# Patient Record
Sex: FEMALE | Race: BLACK OR AFRICAN AMERICAN | Employment: FULL TIME | URBAN - METROPOLITAN AREA
[De-identification: names, ages, dates, MRNs, and addresses within clinical notes are randomized per-mention and may not be internally consistent; named-entity substitution may affect disease eponyms.]

---

## 2017-04-24 ENCOUNTER — HOSPITAL ENCOUNTER (OUTPATIENT)
Dept: MAMMOGRAPHY | Age: 42
Discharge: HOME | End: 2017-04-24
Attending: FAMILY MEDICINE
Payer: COMMERCIAL

## 2017-04-24 ENCOUNTER — HOSPITAL ENCOUNTER (OUTPATIENT)
Dept: ULTRASOUND IMAGING | Age: 42
Discharge: HOME | End: 2017-04-24
Attending: FAMILY MEDICINE
Payer: COMMERCIAL

## 2017-04-24 DIAGNOSIS — E01.0 THYROMEGALY: ICD-10-CM

## 2017-04-24 DIAGNOSIS — Z12.31 VISIT FOR SCREENING MAMMOGRAM: ICD-10-CM

## 2017-04-24 PROCEDURE — 77067 SCR MAMMO BI INCL CAD: CPT

## 2017-04-24 PROCEDURE — 76536 US EXAM OF HEAD AND NECK: CPT

## 2017-05-30 ENCOUNTER — HOSPITAL ENCOUNTER (OUTPATIENT)
Dept: MAMMOGRAPHY | Age: 42
Discharge: HOME | End: 2017-05-30
Attending: FAMILY MEDICINE
Payer: COMMERCIAL

## 2017-05-30 ENCOUNTER — HOSPITAL ENCOUNTER (OUTPATIENT)
Dept: ULTRASOUND IMAGING | Age: 42
Discharge: HOME | End: 2017-05-30
Attending: FAMILY MEDICINE
Payer: COMMERCIAL

## 2017-05-30 DIAGNOSIS — N64.89 BREAST ASYMMETRY: ICD-10-CM

## 2017-05-30 PROCEDURE — 77065 DX MAMMO INCL CAD UNI: CPT

## 2017-05-30 PROCEDURE — 76642 ULTRASOUND BREAST LIMITED: CPT

## 2017-11-24 ENCOUNTER — HOSPITAL ENCOUNTER (OUTPATIENT)
Dept: ULTRASOUND IMAGING | Age: 42
Discharge: HOME | End: 2017-11-24
Attending: FAMILY MEDICINE
Payer: COMMERCIAL

## 2017-11-24 ENCOUNTER — HOSPITAL ENCOUNTER (OUTPATIENT)
Dept: MAMMOGRAPHY | Age: 42
Discharge: HOME | End: 2017-11-24
Attending: FAMILY MEDICINE
Payer: COMMERCIAL

## 2017-11-24 DIAGNOSIS — N64.89 BREAST ASYMMETRY: ICD-10-CM

## 2017-11-24 PROCEDURE — 77061 BREAST TOMOSYNTHESIS UNI: CPT

## 2017-11-24 PROCEDURE — 76642 ULTRASOUND BREAST LIMITED: CPT

## 2018-05-31 ENCOUNTER — HOSPITAL ENCOUNTER (OUTPATIENT)
Dept: ULTRASOUND IMAGING | Age: 43
Discharge: HOME | End: 2018-05-31
Attending: FAMILY MEDICINE
Payer: COMMERCIAL

## 2018-05-31 ENCOUNTER — HOSPITAL ENCOUNTER (OUTPATIENT)
Dept: MAMMOGRAPHY | Age: 43
Discharge: HOME | End: 2018-05-31
Attending: FAMILY MEDICINE
Payer: COMMERCIAL

## 2018-05-31 DIAGNOSIS — N64.89 BREAST ASYMMETRY IN FEMALE: ICD-10-CM

## 2018-05-31 PROCEDURE — 76642 ULTRASOUND BREAST LIMITED: CPT

## 2018-05-31 PROCEDURE — 77062 BREAST TOMOSYNTHESIS BI: CPT

## 2019-05-24 ENCOUNTER — HOSPITAL ENCOUNTER (OUTPATIENT)
Dept: MAMMOGRAPHY | Age: 44
Discharge: HOME | End: 2019-05-24
Attending: FAMILY MEDICINE
Payer: COMMERCIAL

## 2019-05-24 DIAGNOSIS — Z12.31 VISIT FOR SCREENING MAMMOGRAM: ICD-10-CM

## 2019-05-24 PROCEDURE — 77067 SCR MAMMO BI INCL CAD: CPT

## 2020-11-02 ENCOUNTER — TRANSCRIBE ORDER (OUTPATIENT)
Dept: SCHEDULING | Age: 45
End: 2020-11-02

## 2020-11-02 DIAGNOSIS — Z12.31 VISIT FOR SCREENING MAMMOGRAM: Primary | ICD-10-CM

## 2021-08-17 ENCOUNTER — TRANSCRIBE ORDER (OUTPATIENT)
Dept: SCHEDULING | Age: 46
End: 2021-08-17

## 2021-08-17 DIAGNOSIS — Z12.31 VISIT FOR SCREENING MAMMOGRAM: Primary | ICD-10-CM

## 2022-08-15 ENCOUNTER — TRANSCRIBE ORDER (OUTPATIENT)
Dept: SCHEDULING | Age: 47
End: 2022-08-15

## 2022-08-15 DIAGNOSIS — Z12.31 VISIT FOR SCREENING MAMMOGRAM: Primary | ICD-10-CM

## 2022-10-19 ENCOUNTER — HOSPITAL ENCOUNTER (OUTPATIENT)
Dept: MAMMOGRAPHY | Age: 47
Discharge: HOME | End: 2022-10-19
Attending: FAMILY MEDICINE
Payer: COMMERCIAL

## 2022-10-19 DIAGNOSIS — Z12.31 VISIT FOR SCREENING MAMMOGRAM: ICD-10-CM

## 2022-10-19 PROCEDURE — 77063 BREAST TOMOSYNTHESIS BI: CPT

## 2023-10-27 ENCOUNTER — HOSPITAL ENCOUNTER (OUTPATIENT)
Facility: HOSPITAL | Age: 48
Discharge: HOME | End: 2023-10-27
Attending: FAMILY MEDICINE
Payer: COMMERCIAL

## 2023-10-27 DIAGNOSIS — Z12.31 VISIT FOR SCREENING MAMMOGRAM: ICD-10-CM

## 2023-10-27 PROCEDURE — 77067 SCR MAMMO BI INCL CAD: CPT

## 2024-01-26 ENCOUNTER — OFFICE VISIT (OUTPATIENT)
Age: 49
End: 2024-01-26

## 2024-01-26 VITALS — BODY MASS INDEX: 41.59 KG/M2 | WEIGHT: 265 LBS | HEIGHT: 67 IN

## 2024-01-26 DIAGNOSIS — G89.29 CHRONIC PAIN OF RIGHT HIP: ICD-10-CM

## 2024-01-26 DIAGNOSIS — M25.551 CHRONIC PAIN OF RIGHT HIP: ICD-10-CM

## 2024-01-26 DIAGNOSIS — M16.12 PRIMARY OSTEOARTHRITIS OF LEFT HIP: ICD-10-CM

## 2024-01-26 DIAGNOSIS — M47.816 PRIMARY OSTEOARTHRITIS OF LUMBAR SPINE: ICD-10-CM

## 2024-01-26 DIAGNOSIS — G89.29 CHRONIC LEFT HIP PAIN: Primary | ICD-10-CM

## 2024-01-26 DIAGNOSIS — M54.50 LUMBAR PAIN: ICD-10-CM

## 2024-01-26 DIAGNOSIS — M25.552 CHRONIC LEFT HIP PAIN: Primary | ICD-10-CM

## 2024-01-26 DIAGNOSIS — M16.11 PRIMARY OSTEOARTHRITIS OF RIGHT HIP: ICD-10-CM

## 2024-01-26 DIAGNOSIS — M70.62 TROCHANTERIC BURSITIS, LEFT HIP: ICD-10-CM

## 2024-01-26 RX ORDER — LIDOCAINE HYDROCHLORIDE 10 MG/ML
3 INJECTION, SOLUTION INFILTRATION; PERINEURAL ONCE
Status: COMPLETED | OUTPATIENT
Start: 2024-01-26 | End: 2024-01-26

## 2024-01-26 RX ORDER — DICLOFENAC EPOLAMINE 0.01 G/1
1 SYSTEM TOPICAL 2 TIMES DAILY PRN
Qty: 60 PATCH | Refills: 1 | Status: SHIPPED | OUTPATIENT
Start: 2024-01-26

## 2024-01-26 RX ORDER — BETAMETHASONE SODIUM PHOSPHATE AND BETAMETHASONE ACETATE 3; 3 MG/ML; MG/ML
6 INJECTION, SUSPENSION INTRA-ARTICULAR; INTRALESIONAL; INTRAMUSCULAR; SOFT TISSUE ONCE
Status: COMPLETED | OUTPATIENT
Start: 2024-01-26 | End: 2024-01-26

## 2024-01-26 RX ORDER — MELOXICAM 15 MG/1
15 TABLET ORAL DAILY PRN
Qty: 30 TABLET | Refills: 3 | Status: SHIPPED | OUTPATIENT
Start: 2024-01-26

## 2024-01-26 RX ADMIN — LIDOCAINE HYDROCHLORIDE 3 ML: 10 INJECTION, SOLUTION INFILTRATION; PERINEURAL at 08:55

## 2024-01-26 RX ADMIN — BETAMETHASONE SODIUM PHOSPHATE AND BETAMETHASONE ACETATE 6 MG: 3; 3 INJECTION, SUSPENSION INTRA-ARTICULAR; INTRALESIONAL; INTRAMUSCULAR; SOFT TISSUE at 08:54

## 2024-01-26 NOTE — PROGRESS NOTES
were reviewed and are negative           Hernandez JEFFREY M.D., have reviewed the history, physical, and have updated the allergic reactions for Margy Tapia         TIME OUT performed immediately prior to the start of procedure:     Hernandez JEFFREY M.D., have performed the following reviews on Margy Tapia prior to the start of the procedure:          - Patient was identified by name and date of birth     - Agreement on procedure being performed was verified     - Risks and benefits explained to the patient     - Patient was positioned for comfort     - Consent was signed and verified     - Patient was advised regarding risks of bruising, bleeding, infection and pain          Time: 8:45 AM          Body Part: intra-bursal injection of the left hip         Medication and Dose: 1mL Celestone preparation, i.e. 6 mg, and 3 mL 1% lidocaine          Date of Procedure: 01/26/24         PROCEDURE PERFORMED BY : Hernandez Hein M.D., [facility](C)          Provider Assisted by: Tri         Patient assisted by: self          Patient tolerated procedure well with no complications     No past medical history on file.    No family history on file.    No current outpatient medications on file.     No current facility-administered medications for this visit.       Not on File    No past surgical history on file.    Social History     Socioeconomic History    Marital status: Single     Spouse name: Not on file    Number of children: Not on file    Years of education: Not on file    Highest education level: Not on file   Occupational History    Not on file   Tobacco Use    Smoking status: Not on file    Smokeless tobacco: Not on file   Substance and Sexual Activity    Alcohol use: Not on file    Drug use: Not on file    Sexual activity: Not on file   Other Topics Concern    Not on file   Social History Narrative    Not on file     Social Determinants of Health     Financial Resource Strain: Not on file   Food Insecurity:

## 2024-02-23 ENCOUNTER — TELEPHONE (OUTPATIENT)
Age: 49
End: 2024-02-23

## 2024-02-23 RX ORDER — LIDOCAINE 50 MG/G
1 PATCH TOPICAL DAILY
Qty: 30 PATCH | Refills: 0 | Status: SHIPPED | OUTPATIENT
Start: 2024-02-23 | End: 2024-03-24

## 2024-02-23 NOTE — TELEPHONE ENCOUNTER
Patient called to requesting advise. The   diclofenac (FLECTOR) 1.3 % PTCH patch  that was ordered for her is not covered by her insurance, it will be $385 out of pocket. She would like to know if there is anything we can do for her insurance to cover it or if there is another similar prescription she can use.    Please advise patient, [phone].

## 2024-03-22 ENCOUNTER — OFFICE VISIT (OUTPATIENT)
Age: 49
End: 2024-03-22
Payer: COMMERCIAL

## 2024-03-22 VITALS — HEIGHT: 67 IN | BODY MASS INDEX: 41.59 KG/M2 | WEIGHT: 265 LBS

## 2024-03-22 DIAGNOSIS — G89.29 CHRONIC LEFT HIP PAIN: ICD-10-CM

## 2024-03-22 DIAGNOSIS — M54.50 LUMBAR PAIN: ICD-10-CM

## 2024-03-22 DIAGNOSIS — M16.12 PRIMARY OSTEOARTHRITIS OF LEFT HIP: Primary | ICD-10-CM

## 2024-03-22 DIAGNOSIS — M47.816 PRIMARY OSTEOARTHRITIS OF LUMBAR SPINE: ICD-10-CM

## 2024-03-22 DIAGNOSIS — M25.852 LEFT HIP IMPINGEMENT SYNDROME: ICD-10-CM

## 2024-03-22 DIAGNOSIS — M70.62 TROCHANTERIC BURSITIS, LEFT HIP: ICD-10-CM

## 2024-03-22 DIAGNOSIS — M25.552 CHRONIC LEFT HIP PAIN: ICD-10-CM

## 2024-03-22 PROCEDURE — 99214 OFFICE O/P EST MOD 30 MIN: CPT | Performed by: ORTHOPAEDIC SURGERY

## 2024-03-22 RX ORDER — CELECOXIB 200 MG/1
200 CAPSULE ORAL DAILY PRN
Qty: 60 CAPSULE | Refills: 3 | Status: CANCELLED | OUTPATIENT
Start: 2024-03-22

## 2024-03-22 NOTE — PROGRESS NOTES
Patient: Margy Tapia                MRN: [id number]       SSN:   YOB: 1975        AGE: 49 y.o.        SEX: female  Body mass index is 41.5 kg/m².    PCP: Alexus Mandel MD  03/22/24    Ms. Tapia is here for return of low back pain and also left hip pain the bursal injection did help but was not a game changer we put her on some Mobic she has been taking it its only been somewhat helpful she points to the low back area and the biotic is being uncomfortable for it worse with standing and bending not too much in the way of groin discomfort denies fevers or chills otherwise feeling well    Examined with female assistant present she is holds her back a bit stiffly and the hip actually examines quite benignly today not particularly tender over the trochanter not much groin discomfort with internal rotation.    Neurologically she is intact distally no foot drop straight leg raise is negative calf nontender Homans' sign is negative and sensation normal    At this point I think about two thirds of her pain is coming from her back she is fairly claustrophobic she does not want an MRI of her back for the hip itself I think her arthritis is stable is not particularly symptomatic and she does not need hip surgery at this time the bursitis is actually somewhat improved I like her to have some therapy for the back and for the hip and we will plan on spine center referral she missed her appointment apparently will make this for her and we did talk about NSAIDs social determinants of health were again reviewed no negative factors affecting patient care thank you                I have personally reviewed the 3-view x-rays of the left hip, obtained today, 01/26/24. Shows fairly severe arthritis of the lumbar spine and moderately advanced arthritis of the left hip, with impingement.     REVIEW OF SYSTEMS:      CON: negative  EYE: negative   ENT: negative  RESP: negative  GI:    negative   :

## 2024-03-25 ENCOUNTER — OFFICE VISIT (OUTPATIENT)
Age: 49
End: 2024-03-25
Payer: COMMERCIAL

## 2024-03-25 VITALS
BODY MASS INDEX: 42.06 KG/M2 | HEIGHT: 67 IN | OXYGEN SATURATION: 98 % | TEMPERATURE: 97.2 F | WEIGHT: 268 LBS | HEART RATE: 73 BPM

## 2024-03-25 DIAGNOSIS — G89.29 CHRONIC BILATERAL LOW BACK PAIN WITH BILATERAL SCIATICA: Primary | ICD-10-CM

## 2024-03-25 DIAGNOSIS — M54.41 CHRONIC BILATERAL LOW BACK PAIN WITH BILATERAL SCIATICA: Primary | ICD-10-CM

## 2024-03-25 DIAGNOSIS — M16.12 OSTEOARTHRITIS OF LEFT HIP, UNSPECIFIED OSTEOARTHRITIS TYPE: ICD-10-CM

## 2024-03-25 DIAGNOSIS — M54.42 CHRONIC BILATERAL LOW BACK PAIN WITH BILATERAL SCIATICA: Primary | ICD-10-CM

## 2024-03-25 DIAGNOSIS — M43.16 SPONDYLOLISTHESIS OF LUMBAR REGION: ICD-10-CM

## 2024-03-25 PROCEDURE — 99204 OFFICE O/P NEW MOD 45 MIN: CPT | Performed by: PHYSICAL MEDICINE & REHABILITATION

## 2024-03-25 PROCEDURE — 72110 X-RAY EXAM L-2 SPINE 4/>VWS: CPT | Performed by: PHYSICAL MEDICINE & REHABILITATION

## 2024-03-25 RX ORDER — PREGABALIN 75 MG/1
CAPSULE ORAL
Qty: 60 CAPSULE | Refills: 2 | Status: SHIPPED | OUTPATIENT
Start: 2024-03-25 | End: 2024-05-25

## 2024-03-25 ASSESSMENT — PATIENT HEALTH QUESTIONNAIRE - PHQ9
SUM OF ALL RESPONSES TO PHQ QUESTIONS 1-9: 0
1. LITTLE INTEREST OR PLEASURE IN DOING THINGS: NOT AT ALL
2. FEELING DOWN, DEPRESSED OR HOPELESS: NOT AT ALL
SUM OF ALL RESPONSES TO PHQ9 QUESTIONS 1 & 2: 0
SUM OF ALL RESPONSES TO PHQ QUESTIONS 1-9: 0

## 2024-03-25 NOTE — PROGRESS NOTES
Margy Tapia presents today for   Chief Complaint   Patient presents with    Back Pain     Lower back pain     Hip Pain     Left hip pain     Leg Pain     Bilateral leg pain        Is someone accompanying this pt? no    Is the patient using any DME equipment during OV? no    Coordination of Care:  1. Have you been to the ER, urgent care clinic since your last visit? no  Hospitalized since your last visit? no    2. Have you seen or consulted any other health care providers outside of the [facility] since your last visit? Dr. Hein Include any pap smears or colon screening. no

## 2024-03-25 NOTE — PROGRESS NOTES
[facility]  [address] [place] [address], [address]  [place] [address]  Phone: [phone]  Fax: [phone]        Margy Tapia  : 1975  PCP: Alexus Mandel MD    NEW PATIENT EVALUATION      ASSESSMENT AND PLAN    Margy was seen today for back pain, hip pain and leg pain.    Diagnoses and all orders for this visit:    Chronic bilateral low back pain with bilateral sciatica  -     [46239] LS Spine 4V  -     pregabalin (LYRICA) 75 MG capsule; One po qhs x 1 week, then may increase to one po BID as tolerated.    Spondylolisthesis of lumbar region  -     pregabalin (LYRICA) 75 MG capsule; One po qhs x 1 week, then may increase to one po BID as tolerated.    Osteoarthritis of left hip, unspecified osteoarthritis type         Margy Tapia is a 49 y.o. female  presenting with signs and symptoms of lumbar stenosis.  She has not had conservative management.  She is due to start physical therapy.  Avoid combination of bending, lifting, and twisting.  Trial Rx Lyrica 75 mg.  Failed NSAIDs and Tylenol.  Discussed injections as treatment option if symptoms do not improve with PT or medication.  MRI if not improving.      Follow-up and Dispositions    Return in about 6 weeks (around 2024) for PT f/u, med adjustment/re-eval.            HISTORY OF PRESENT ILLNESS    Margy Tapia is seen today in consultation for low back pain. Patient was referred by Dr. Hernandez Hein. Notes will be sent to referring provider and PCP Alexus Mandel MD.     Note from Dr. Hein's office reviewed (2024). Patient received left hip bursa injection (2024) with some benefit. L HIP XR (2024) reviewed.    Patient presents with low back pain x1 year that radiates into her bilateral legs. She describes weakness in her legs x1 month. She denies numbness or tingling in her legs.    Patient states that walking exacerbates her pain. She endorses occasional

## 2024-03-28 ENCOUNTER — HOSPITAL ENCOUNTER (OUTPATIENT)
Facility: HOSPITAL | Age: 49
Setting detail: RECURRING SERIES
Discharge: HOME | End: 2024-03-31
Payer: COMMERCIAL

## 2024-03-28 PROCEDURE — 97162 PT EVAL MOD COMPLEX 30 MIN: CPT

## 2024-03-28 PROCEDURE — 97535 SELF CARE MNGMENT TRAINING: CPT

## 2024-03-28 PROCEDURE — 97110 THERAPEUTIC EXERCISES: CPT

## 2024-03-28 NOTE — PROGRESS NOTES
interventions, analyze and address functional mobility deficits, analyze and address ROM deficits, analyze and address strength deficits, analyze and address soft tissue restrictions, analyze and cue for proper movement patterns, analyze and modify for postural abnormalities, and instruct in home and community integration to address functional deficits and attain remaining goals.    Progress toward goals / Updated goals:  []  See Progress Note/Recertification  Short Term Goals: To be accomplished in 2 weeks  The patient will demonstrate independence and compliance with HEP to maximize therapeutic benefit.   IE: issued HEP  The patient will improve left hip flexion to WNL to improve ease of ADLs.   IE: pain through 95 degrees left hip flexion  Long Term Goals: To be accomplished in 12 weeks  The patient will improve FOTO score to 59 to improve quality of life.   IE: 53  The patient will improve will improve hip ABD and EXT strength to 5/5 MMT to improve ease of ADLs.   IE: 5/5 MMT  The patient will demonstrate negative baltazar test to improve ease of ALDs.    IE: + left  The patient will report 75% improvement since SOC since onset to improve ease of ADL.   IE: 0%    PLAN  Yes  Continue plan of care  []  Upgrade activities as tolerated  []  Discharge due to :  []  Other:    Camilo Akins, PT    3/28/2024    12:46 PM    Future Appointments   Date Time Provider Department Center   4/11/2024  5:10 PM [facility]   5/8/2024  3:40 PM Susie Fu MD [facility]

## 2024-03-28 NOTE — PROGRESS NOTES
addressin body structure, function, activity limitation and / or participation in recreation  ;Presentation:  MEDIUM Complexity : Evolving with changing characteristics  ;Clinical Decision Making:  MEDIUM Complexity : FOTO score of 26-74 FOTO score = an established functional score where 100 = no disability  Overall Complexity Rating: MEDIUM  Problem List: pain affecting function, decrease ROM, decrease strength, decrease ADL/functional abilities, decrease activity tolerance, decrease flexibility/joint mobility, and decrease transfer abilities    Treatment Plan may include any combination of the followin Therapeutic Exercise, 86521 Neuromuscular Re-Education, 20992 Manual Therapy, 11696 Therapeutic Activity, 14114 Self Care/Home Management, 96181 Electrical Stim unattended, and 87211 Ultrasound  Patient / Family readiness to learn indicated by: asking questions, trying to perform skills, interest, return verbalization , and return demonstration   Persons(s) to be included in education: patient (P)  Barriers to Learning/Limitations: none  Measures taken if barriers to learning present: None  Patient Goal (s): \"Hope pain will go away.\"  Patient Self Reported Health Status: good  Rehabilitation Potential: excellent    Short Term Goals: To be accomplished in 2 weeks  The patient will demonstrate independence and compliance with HEP to maximize therapeutic benefit.  The patient will improve left hip flexion to WNL to improve ease of ADLs.  Long Term Goals: To be accomplished in 12 weeks  The patient will improve FOTO score to 59 to improve quality of life  The patient will improve will improve hip ABD and EXT strength to 5/5 MMT to improve ease of ADLs.  The patient will demonstrate negative baltazar test to improve ease of ALDs.   The patient will report 75% improvement since SOC since onset to improve ease of ADL.    Frequency / Duration: Patient to be seen 2 times per week for 12 weeks  Goals will be assigned

## 2024-04-17 ENCOUNTER — TELEPHONE (OUTPATIENT)
Facility: HOSPITAL | Age: 49
End: 2024-04-17

## 2024-06-13 ENCOUNTER — TELEPHONE (OUTPATIENT)
Age: 49
End: 2024-06-13

## 2024-06-13 NOTE — TELEPHONE ENCOUNTER
Attempted to contact the pt regarding her message. She was not able to be reached. A message was not able to be left because the voicemail box is not set up. Will need to ask the pt more questions about her request. Will try to contact pt at a later time.

## 2024-06-13 NOTE — TELEPHONE ENCOUNTER
Pt no showed her 5/8/24 fu appt.  She will need to keep her upcoming appt before any med changes.

## 2024-06-13 NOTE — TELEPHONE ENCOUNTER
Patient called and would like to know if she can have a stronger prescription sent to her local pharmacy.        Please call and advise patient at   [phone]

## 2024-06-14 NOTE — TELEPHONE ENCOUNTER
Patient called back I relayed the messages in the chart and advised she was added to the wait list, she understood.

## 2024-08-28 ENCOUNTER — TELEPHONE (OUTPATIENT)
Age: 49
End: 2024-08-28

## 2024-08-28 NOTE — TELEPHONE ENCOUNTER
Patient called in and ask if there was any way that she could be soon sometime this week due to the Painful left Hip    Please advise patient @ [phone]

## 2024-08-29 NOTE — TELEPHONE ENCOUNTER
I called and spoke to the pt. The pt was identified using 2 pt identifiers. She was asked if she was seeing Dr. Hein for her hip pain. She verbalized that he referred her to our office because her back pain was causing her hip to hurt. She is scheduled for 09/23/23 for a follow up with Dr. Fu and just wants to try and be seen sooner. I informed her that there are no earlier appts at this time but there may be cancellations on a daily basis. She verbalized understanding and is ok with trying to get an earlier appt through a cancellation. No questions or concerns voiced at this time.

## 2024-09-05 ENCOUNTER — OFFICE VISIT (OUTPATIENT)
Age: 49
End: 2024-09-05
Payer: COMMERCIAL

## 2024-09-05 VITALS
WEIGHT: 259 LBS | HEART RATE: 72 BPM | RESPIRATION RATE: 18 BRPM | TEMPERATURE: 97.9 F | BODY MASS INDEX: 40.65 KG/M2 | DIASTOLIC BLOOD PRESSURE: 100 MMHG | HEIGHT: 67 IN | SYSTOLIC BLOOD PRESSURE: 150 MMHG

## 2024-09-05 DIAGNOSIS — M79.18 CHRONIC GLUTEAL PAIN: ICD-10-CM

## 2024-09-05 DIAGNOSIS — M43.16 SPONDYLOLISTHESIS OF LUMBAR REGION: ICD-10-CM

## 2024-09-05 DIAGNOSIS — G89.29 CHRONIC GLUTEAL PAIN: ICD-10-CM

## 2024-09-05 DIAGNOSIS — M16.12 PRIMARY OSTEOARTHRITIS OF LEFT HIP: ICD-10-CM

## 2024-09-05 DIAGNOSIS — M53.3 PAIN OF LEFT SACROILIAC JOINT: Primary | ICD-10-CM

## 2024-09-05 PROBLEM — R20.2 PARESTHESIA OF BOTH HANDS: Status: ACTIVE | Noted: 2023-07-20

## 2024-09-05 PROCEDURE — 99214 OFFICE O/P EST MOD 30 MIN: CPT | Performed by: PHYSICAL MEDICINE & REHABILITATION

## 2024-09-05 PROCEDURE — 96372 THER/PROPH/DIAG INJ SC/IM: CPT | Performed by: PHYSICAL MEDICINE & REHABILITATION

## 2024-09-05 RX ORDER — TRAMADOL HYDROCHLORIDE 50 MG/1
TABLET ORAL
COMMUNITY
Start: 2024-07-01

## 2024-09-05 RX ORDER — BENAZEPRIL/HYDROCHLOROTHIAZIDE 20 MG-25MG
1 TABLET ORAL DAILY
COMMUNITY

## 2024-09-05 RX ORDER — SEMAGLUTIDE 1.34 MG/ML
INJECTION, SOLUTION SUBCUTANEOUS
COMMUNITY
Start: 2024-06-03

## 2024-09-05 RX ORDER — KETOROLAC TROMETHAMINE 30 MG/ML
30 INJECTION, SOLUTION INTRAMUSCULAR; INTRAVENOUS ONCE
Status: COMPLETED | OUTPATIENT
Start: 2024-09-05 | End: 2024-09-05

## 2024-09-05 RX ORDER — TOPIRAMATE 25 MG/1
TABLET, FILM COATED ORAL
Qty: 90 TABLET | Refills: 2 | Status: SHIPPED | OUTPATIENT
Start: 2024-09-05

## 2024-09-05 RX ADMIN — KETOROLAC TROMETHAMINE 30 MG: 30 INJECTION, SOLUTION INTRAMUSCULAR; INTRAVENOUS at 16:00

## 2024-09-05 NOTE — PROGRESS NOTES
Consent was explained to the pt and signed. No questions or concerns voiced at this time. Pt given 30mg/1ml of toradol IM in left gluteus. No sign or symptoms of infection noted at injection site. There was no bleeding, swelling or leaking noted after injection. Pt handed injection information sheet to take home. Ms. Tapia tolerated the injection well and did not want to wait in the exam room for observation. She ambulated to check out with out any issues.

## 2024-09-05 NOTE — PROGRESS NOTES
[facility]    [address] [place] [address], [address]  [place] [address]  Phone: [phone]  Fax: [phone]        Margy Tapia  : 1975  PCP: Alexus Mandel MD    PROGRESS NOTE      ASSESSMENT AND PLAN    Margy was seen today for hip pain.    Diagnoses and all orders for this visit:    Pain of left sacroiliac joint  -     topiramate (TOPAMAX) 25 MG tablet; First month ramp instructions: Take 1 tablet p.o. nightly x1 week.  Then increase to 2 tablets p.o qhs. x1 week.  Then increase to 3 tablets p.o. nightly thereafter.  -     ketorolac (TORADOL) injection 30 mg    Chronic gluteal pain  -     topiramate (TOPAMAX) 25 MG tablet; First month ramp instructions: Take 1 tablet p.o. nightly x1 week.  Then increase to 2 tablets p.o qhs. x1 week.  Then increase to 3 tablets p.o. nightly thereafter.    Spondylolisthesis of lumbar region  -     topiramate (TOPAMAX) 25 MG tablet; First month ramp instructions: Take 1 tablet p.o. nightly x1 week.  Then increase to 2 tablets p.o qhs. x1 week.  Then increase to 3 tablets p.o. nightly thereafter.    Primary osteoarthritis of left hip  -     ketorolac (TORADOL) injection 30 mg        Margy Tapia is a 49 y.o. female with progression of left gluteal pain, combination SI joint/bursitis/OA hip.  Occasional radicular pain.  Gluteal pain is worse than her back pain at this point.  Failed PT and pregabalin.  Trial of topiramate.  Toradol 30 mg IM x 1 now.  Follow-up with Dr. Hein regarding her hip  Could contemplate SI joint injections, although they feel that her pain is multifactorial.  Would need MRI lumbar versus pelvis.          HISTORY OF PRESENT ILLNESS    Margy Tapia is a 49 y.o. female presents for follow up of LBP. At her last OV (2024), recommend to avoid bending, lifting, and twisting motions. Trial Lyrica 75mg QHS x1 week them ramp to BID. Discussed injections as an option.     Patient describes

## 2024-12-11 ENCOUNTER — TRANSCRIBE ORDERS (OUTPATIENT)
Facility: HOSPITAL | Age: 49
End: 2024-12-11

## 2024-12-11 DIAGNOSIS — Z12.31 VISIT FOR SCREENING MAMMOGRAM: Primary | ICD-10-CM

## 2024-12-31 ENCOUNTER — HOSPITAL ENCOUNTER (OUTPATIENT)
Facility: HOSPITAL | Age: 49
Discharge: HOME | End: 2025-01-03
Attending: FAMILY MEDICINE
Payer: COMMERCIAL

## 2024-12-31 VITALS — BODY MASS INDEX: 42.06 KG/M2 | HEIGHT: 67 IN | WEIGHT: 268 LBS

## 2024-12-31 DIAGNOSIS — Z12.31 VISIT FOR SCREENING MAMMOGRAM: ICD-10-CM

## 2024-12-31 PROCEDURE — 77063 BREAST TOMOSYNTHESIS BI: CPT

## 2025-01-02 ENCOUNTER — OFFICE VISIT (OUTPATIENT)
Age: 50
End: 2025-01-02
Payer: COMMERCIAL

## 2025-01-02 VITALS — HEIGHT: 67 IN | BODY MASS INDEX: 41.97 KG/M2 | RESPIRATION RATE: 16 BRPM

## 2025-01-02 DIAGNOSIS — M54.2 NECK PAIN: ICD-10-CM

## 2025-01-02 DIAGNOSIS — M50.122 CERVICAL DISC DISORDER AT C5-C6 LEVEL WITH RADICULOPATHY: ICD-10-CM

## 2025-01-02 DIAGNOSIS — M50.121 CERVICAL DISC DISORDER AT C4-C5 LEVEL WITH RADICULOPATHY: Primary | ICD-10-CM

## 2025-01-02 DIAGNOSIS — M25.511 ACUTE PAIN OF RIGHT SHOULDER: ICD-10-CM

## 2025-01-02 PROCEDURE — 72040 X-RAY EXAM NECK SPINE 2-3 VW: CPT | Performed by: NURSE PRACTITIONER

## 2025-01-02 PROCEDURE — 73030 X-RAY EXAM OF SHOULDER: CPT | Performed by: NURSE PRACTITIONER

## 2025-01-02 PROCEDURE — 99204 OFFICE O/P NEW MOD 45 MIN: CPT | Performed by: NURSE PRACTITIONER

## 2025-01-02 NOTE — PROGRESS NOTES
Margy Tapia  1975   Chief Complaint   Patient presents with    Shoulder Pain     Acute right shoulder pain        HISTORY OF PRESENT ILLNESS  Margy Tapia is a 49 y.o. female who presents today for evaluation of right shoulder pain. Pain is a 10/10. Patient woke up with the pain and states it has been present for 3 weeks. She denies injury or precipitating event. Denies numbness and tingling. Pain radiates from the neck into the trapezius and scapular region. She has pain with flexion of the neck.  Has tried following treatments: Injections:No; Brace:No; Therapy:No; Cane/Crutch:No      No Known Allergies     No past medical history on file.   Social History       Tobacco History       Smoking Status  Never      Passive Exposure  Never      Smokeless Tobacco Use  Never              Alcohol History       Alcohol Use Status  Not Asked              Drug Use       Drug Use Status  Not Asked              Sexual Activity       Sexually Active  Not Asked                   No past surgical history on file.   No family history on file.  Current Outpatient Medications   Medication Sig    traMADol (ULTRAM) 50 MG tablet take 1 tablet by mouth twice a day if needed for chronic pain    OZEMPIC, 1 MG/DOSE, 4 MG/3ML SOPN sc injection inject 1 milligram subcutaneously every week for diabetes    benazepril-hydrochlorthiazide (LOTENSIN HCT) 20-25 MG per tablet Take 1 tablet by mouth daily    topiramate (TOPAMAX) 25 MG tablet First month ramp instructions: Take 1 tablet p.o. nightly x1 week.  Then increase to 2 tablets p.o qhs. x1 week.  Then increase to 3 tablets p.o. nightly thereafter.     No current facility-administered medications for this visit.       REVIEW OF SYSTEM   Patient denies: Weight loss, Fever/Chills, HA, Visual changes, Fatigue, Chest pain, SOB, Abdominal pain, N/V/D/C, Blood in stool or urine, Edema.   Pertinent positive as above in HPI. All others were negative    PHYSICAL EXAM:   Resp

## 2025-01-03 ENCOUNTER — TELEPHONE (OUTPATIENT)
Age: 50
End: 2025-01-03

## 2025-01-03 RX ORDER — METHYLPREDNISOLONE 4 MG/1
TABLET ORAL
Qty: 1 KIT | Refills: 0 | Status: SHIPPED | OUTPATIENT
Start: 2025-01-03

## 2025-01-03 NOTE — TELEPHONE ENCOUNTER
Patient states that some steroid are going to be called in for her but there's no medication called in for her yet      [facility]    Patient tel [phone]

## 2025-02-26 NOTE — PROGRESS NOTES
[facility]    [facility], [address]  [place] [address]  Phone: [phone]  Fax: [phone]        Margy Tapia  : 1975  PCP: Alexus Mandel MD    PROGRESS NOTE      ASSESSMENT AND PLAN    Margy was seen today for neck pain and shoulder pain.    Diagnoses and all orders for this visit:    Cervical spondylosis  -     BS - [facility] - [facility], [place]  -     topiramate (TOPAMAX) 25 MG tablet; Take 1 tablet by mouth 3 times daily  -     ketorolac (TORADOL) injection 15 mg    Spasm of right trapezius muscle  -     BS - [facility] - [facility], [place]  -     topiramate (TOPAMAX) 25 MG tablet; Take 1 tablet by mouth 3 times daily        Margy Tapia is a 50 y.o. female, D [employer] employee, with 2 months of right upper quadrant nontraumatic symptoms.  Occasional paresthesias, known history CTS.  Needs scapular re-education.  Repetitive motion symptoms.  Administered Toradol 15mg IM x1 now.   Rx low-dose of Topamax 25mg TID as tolerated. Cautioned about possible cognitive issues and somnolence.   Referral to PT for neck and right shoulder pain. Patient given paper with referral and phone number.  Patient given physician-directed cervical arthritis and scapular exercises to perform as tolerated.   Discussed C MRI if symptoms do not improve post PT.   Possible cortisone injections if symptoms persist.     Follow-up and Dispositions    Return in about 6 weeks (around 4/10/2025) for Topamax FU, spine eval.       HISTORY OF PRESENT ILLNESS    Margy Tapia is a 50 y.o. female presents for c/o cervical and right shoulder pain. Patient was referred by Carly Rapp ACNP. At her last OV (2024) patient was seen for L SIJ and low back pain. Administered Toradol 30mg IM. Trial of Topamax 25mg ramping to 75mg QHS. Recommended f/u with Dr. Hein regarding hip pain.     Reviewed notes from  Prior Authorization faxed to [phone]  Confirmation received. Placed in red folder with barcode.

## 2025-02-27 ENCOUNTER — OFFICE VISIT (OUTPATIENT)
Age: 50
End: 2025-02-27
Payer: COMMERCIAL

## 2025-02-27 VITALS
HEART RATE: 83 BPM | TEMPERATURE: 96.8 F | DIASTOLIC BLOOD PRESSURE: 91 MMHG | HEIGHT: 67 IN | SYSTOLIC BLOOD PRESSURE: 142 MMHG | BODY MASS INDEX: 41.28 KG/M2 | WEIGHT: 263 LBS | OXYGEN SATURATION: 97 %

## 2025-02-27 DIAGNOSIS — M47.812 CERVICAL SPONDYLOSIS: Primary | ICD-10-CM

## 2025-02-27 DIAGNOSIS — M62.830 SPASM OF RIGHT TRAPEZIUS MUSCLE: ICD-10-CM

## 2025-02-27 PROCEDURE — 99213 OFFICE O/P EST LOW 20 MIN: CPT | Performed by: PHYSICAL MEDICINE & REHABILITATION

## 2025-02-27 PROCEDURE — 96372 THER/PROPH/DIAG INJ SC/IM: CPT | Performed by: PHYSICAL MEDICINE & REHABILITATION

## 2025-02-27 RX ORDER — OMEPRAZOLE 40 MG/1
1 CAPSULE, DELAYED RELEASE ORAL DAILY PRN
COMMUNITY
Start: 2025-01-07

## 2025-02-27 RX ORDER — IBUPROFEN 800 MG/1
TABLET, FILM COATED ORAL
COMMUNITY
Start: 2025-01-07

## 2025-02-27 RX ORDER — CYCLOBENZAPRINE HCL 10 MG
10 TABLET ORAL NIGHTLY PRN
COMMUNITY
Start: 2025-01-07

## 2025-02-27 RX ORDER — KETOROLAC TROMETHAMINE 15 MG/ML
15 INJECTION, SOLUTION INTRAMUSCULAR; INTRAVENOUS ONCE
Status: COMPLETED | OUTPATIENT
Start: 2025-02-27 | End: 2025-02-27

## 2025-02-27 RX ORDER — TOPIRAMATE 25 MG/1
25 TABLET, FILM COATED ORAL 3 TIMES DAILY
Qty: 90 TABLET | Refills: 2 | Status: SHIPPED | OUTPATIENT
Start: 2025-02-27

## 2025-02-27 RX ADMIN — KETOROLAC TROMETHAMINE 15 MG: 15 INJECTION, SOLUTION INTRAMUSCULAR; INTRAVENOUS at 09:34

## 2025-02-27 NOTE — PATIENT INSTRUCTIONS
Shoulder Blade: Exercises  Introduction  Here are some examples of exercises for you to try. The exercises may be suggested for a condition or for rehabilitation. Start each exercise slowly. Ease off the exercises if you start to have pain.  You will be told when to start these exercises and which ones will work best for you.  How to do the exercises  Shoulder roll    Stand tall with your chin slightly tucked. Imagine that a string at the top of your head is pulling you straight up.  Keep your arms relaxed. All motion will be in your shoulders.  Shrug your shoulders up toward your ears, then up and back. [place] your shoulders down and back, like you're sliding your hands down into your back pants pockets.  Repeat the circles at least 2 to 4 times.  This exercise is also helpful anytime you want to relax.  Lower neck and upper back stretch    With your arms about shoulder height, clasp your hands in front of you.  Drop your chin toward your chest.  Reach straight forward so you are rounding your upper back. Think about pulling your shoulder blades apart. You'll feel a stretch across your upper back and shoulders. Hold for at least 6 seconds.  Repeat 2 to 4 times.  Triceps stretch    Stand or sit up straight. If you're standing, keep your feet about hip-width apart. Reach your affected arm straight up.  Keeping your elbow in place, bend your arm and reach your hand down behind your back.  With your other hand, apply gentle pressure to the bent elbow. You'll feel a stretch at the back of your upper arm and shoulder. Hold about 15 to 30 seconds.  Repeat 2 to 4 times.  It's a good idea to repeat these steps with your other arm.  Shoulder stretch    Relax your shoulders.  Raise one arm to shoulder height, and reach it across your chest.  Pull the arm slightly toward you with your other arm. This will help you get a gentle stretch. Hold for about 6 seconds.  Repeat 2 to 4 times.  Shoulder blade squeeze    Sit or stand

## 2025-02-27 NOTE — PROGRESS NOTES
Consent was explained to the pt and signed. No questions or concerns voiced at this time. Pt given 15mg/1ml of toradol IM in left gluteus. No sign or symptoms of infection noted at injection site. There was no bleeding, swelling or leaking noted after injection. Pt handed injection information sheet to take home. Ms. Tapia tolerated the injection well and did not want to wait in the exam room for observation. She ambulated to check out with out any issues.

## 2025-02-27 NOTE — PROGRESS NOTES
Margy Tapia presents today for   Chief Complaint   Patient presents with    Neck Pain    Shoulder Pain     right       Is someone accompanying this pt? no    Is the patient using any DME equipment during OV? no    Coordination of Care:  1. Have you been to the ER, urgent care clinic since your last visit? Yes, pt went to urgent care in December for her shoulder/neck pain  Hospitalized since your last visit? no    2. Have you seen or consulted any other health care providers outside of the [facility] since your last visit? Yes, ortho, pcp  Include any pap smears or colon screening. no